# Patient Record
Sex: FEMALE | Employment: UNEMPLOYED | URBAN - METROPOLITAN AREA
[De-identification: names, ages, dates, MRNs, and addresses within clinical notes are randomized per-mention and may not be internally consistent; named-entity substitution may affect disease eponyms.]

---

## 2021-11-16 ENCOUNTER — OFFICE VISIT (OUTPATIENT)
Dept: URGENT CARE | Facility: CLINIC | Age: 56
End: 2021-11-16
Payer: COMMERCIAL

## 2021-11-16 VITALS
WEIGHT: 110 LBS | OXYGEN SATURATION: 98 % | HEART RATE: 68 BPM | DIASTOLIC BLOOD PRESSURE: 71 MMHG | RESPIRATION RATE: 15 BRPM | BODY MASS INDEX: 18.78 KG/M2 | HEIGHT: 64 IN | SYSTOLIC BLOOD PRESSURE: 103 MMHG | TEMPERATURE: 98 F

## 2021-11-16 DIAGNOSIS — H00.011 HORDEOLUM EXTERNUM OF RIGHT UPPER EYELID: Primary | ICD-10-CM

## 2021-11-16 PROBLEM — N60.11 DIFFUSE CYSTIC MASTOPATHY OF BOTH BREASTS: Status: ACTIVE | Noted: 2020-01-31

## 2021-11-16 PROBLEM — N60.12 DIFFUSE CYSTIC MASTOPATHY OF BOTH BREASTS: Status: ACTIVE | Noted: 2020-01-31

## 2021-11-16 PROBLEM — N95.2 ATROPHIC VAGINITIS: Status: ACTIVE | Noted: 2019-01-18

## 2021-11-16 PROCEDURE — 3008F PR BODY MASS INDEX (BMI) DOCUMENTED: ICD-10-PCS | Mod: CPTII,S$GLB,, | Performed by: NURSE PRACTITIONER

## 2021-11-16 PROCEDURE — 3008F BODY MASS INDEX DOCD: CPT | Mod: CPTII,S$GLB,, | Performed by: NURSE PRACTITIONER

## 2021-11-16 PROCEDURE — 3078F DIAST BP <80 MM HG: CPT | Mod: CPTII,S$GLB,, | Performed by: NURSE PRACTITIONER

## 2021-11-16 PROCEDURE — 1160F RVW MEDS BY RX/DR IN RCRD: CPT | Mod: CPTII,S$GLB,, | Performed by: NURSE PRACTITIONER

## 2021-11-16 PROCEDURE — 3078F PR MOST RECENT DIASTOLIC BLOOD PRESSURE < 80 MM HG: ICD-10-PCS | Mod: CPTII,S$GLB,, | Performed by: NURSE PRACTITIONER

## 2021-11-16 PROCEDURE — 1159F MED LIST DOCD IN RCRD: CPT | Mod: CPTII,S$GLB,, | Performed by: NURSE PRACTITIONER

## 2021-11-16 PROCEDURE — 3074F PR MOST RECENT SYSTOLIC BLOOD PRESSURE < 130 MM HG: ICD-10-PCS | Mod: CPTII,S$GLB,, | Performed by: NURSE PRACTITIONER

## 2021-11-16 PROCEDURE — 1159F PR MEDICATION LIST DOCUMENTED IN MEDICAL RECORD: ICD-10-PCS | Mod: CPTII,S$GLB,, | Performed by: NURSE PRACTITIONER

## 2021-11-16 PROCEDURE — 1160F PR REVIEW ALL MEDS BY PRESCRIBER/CLIN PHARMACIST DOCUMENTED: ICD-10-PCS | Mod: CPTII,S$GLB,, | Performed by: NURSE PRACTITIONER

## 2021-11-16 PROCEDURE — 3074F SYST BP LT 130 MM HG: CPT | Mod: CPTII,S$GLB,, | Performed by: NURSE PRACTITIONER

## 2021-11-16 PROCEDURE — 99203 PR OFFICE/OUTPT VISIT, NEW, LEVL III, 30-44 MIN: ICD-10-PCS | Mod: S$GLB,,, | Performed by: NURSE PRACTITIONER

## 2021-11-16 PROCEDURE — 99203 OFFICE O/P NEW LOW 30 MIN: CPT | Mod: S$GLB,,, | Performed by: NURSE PRACTITIONER

## 2021-11-16 RX ORDER — LEVOTHYROXINE SODIUM 88 UG/1
88 TABLET ORAL
COMMUNITY
Start: 2021-07-19

## 2021-11-16 RX ORDER — SPIRONOLACTONE 50 MG/1
TABLET, FILM COATED ORAL
COMMUNITY
Start: 2021-09-27

## 2021-11-16 RX ORDER — AMLODIPINE BESYLATE 10 MG/1
10 TABLET ORAL
COMMUNITY

## 2021-11-16 RX ORDER — LORATADINE 10 MG/1
10 TABLET ORAL
COMMUNITY

## 2021-11-16 RX ORDER — POLYMYXIN B SULFATE AND TRIMETHOPRIM 1; 10000 MG/ML; [USP'U]/ML
1 SOLUTION OPHTHALMIC EVERY 6 HOURS
Qty: 1.3334 ML | Refills: 0 | Status: SHIPPED | OUTPATIENT
Start: 2021-11-16 | End: 2021-11-21

## 2021-11-16 RX ORDER — LIOTHYRONINE SODIUM 5 UG/1
TABLET ORAL
COMMUNITY
Start: 2021-07-22

## 2023-08-06 ENCOUNTER — OFFICE VISIT (OUTPATIENT)
Dept: URGENT CARE | Facility: CLINIC | Age: 58
End: 2023-08-06
Payer: COMMERCIAL

## 2023-08-06 VITALS
WEIGHT: 110 LBS | HEIGHT: 64 IN | BODY MASS INDEX: 18.78 KG/M2 | RESPIRATION RATE: 16 BRPM | OXYGEN SATURATION: 99 % | SYSTOLIC BLOOD PRESSURE: 105 MMHG | TEMPERATURE: 97 F | HEART RATE: 68 BPM | DIASTOLIC BLOOD PRESSURE: 72 MMHG

## 2023-08-06 DIAGNOSIS — N30.01 ACUTE CYSTITIS WITH HEMATURIA: ICD-10-CM

## 2023-08-06 DIAGNOSIS — R35.0 FREQUENCY OF URINATION: ICD-10-CM

## 2023-08-06 DIAGNOSIS — R30.0 DYSURIA: Primary | ICD-10-CM

## 2023-08-06 DIAGNOSIS — J06.9 ACUTE URI: ICD-10-CM

## 2023-08-06 LAB
BILIRUB UR QL STRIP: NEGATIVE
GLUCOSE UR QL STRIP: NEGATIVE
KETONES UR QL STRIP: NEGATIVE
LEUKOCYTE ESTERASE UR QL STRIP: POSITIVE
PH, POC UA: 6.5 (ref 5–8)
POC BLOOD, URINE: POSITIVE
POC NITRATES, URINE: NEGATIVE
PROT UR QL STRIP: POSITIVE
SP GR UR STRIP: 1.02 (ref 1–1.03)
UROBILINOGEN UR STRIP-ACNC: NORMAL (ref 0.1–1.1)

## 2023-08-06 PROCEDURE — 99214 OFFICE O/P EST MOD 30 MIN: CPT | Mod: S$GLB,,, | Performed by: FAMILY MEDICINE

## 2023-08-06 PROCEDURE — 81003 URINALYSIS AUTO W/O SCOPE: CPT | Mod: QW,S$GLB,, | Performed by: FAMILY MEDICINE

## 2023-08-06 PROCEDURE — 87086 URINE CULTURE/COLONY COUNT: CPT | Performed by: FAMILY MEDICINE

## 2023-08-06 PROCEDURE — 99214 PR OFFICE/OUTPT VISIT, EST, LEVL IV, 30-39 MIN: ICD-10-PCS | Mod: S$GLB,,, | Performed by: FAMILY MEDICINE

## 2023-08-06 PROCEDURE — 81003 POCT URINALYSIS, DIPSTICK, AUTOMATED, W/O SCOPE: ICD-10-PCS | Mod: QW,S$GLB,, | Performed by: FAMILY MEDICINE

## 2023-08-06 RX ORDER — CEPHALEXIN 500 MG/1
500 CAPSULE ORAL EVERY 12 HOURS
Qty: 10 CAPSULE | Refills: 0 | Status: SHIPPED | OUTPATIENT
Start: 2023-08-06 | End: 2023-08-11

## 2023-08-06 RX ORDER — PROGESTERONE 100 MG/1
100 CAPSULE ORAL NIGHTLY
COMMUNITY
Start: 2023-03-31

## 2023-08-06 RX ORDER — ALBUTEROL SULFATE 90 UG/1
2 AEROSOL, METERED RESPIRATORY (INHALATION) EVERY 6 HOURS PRN
COMMUNITY

## 2023-08-06 NOTE — PROGRESS NOTES
"Subjective:      Patient ID: Dimple Carpio is a 57 y.o. female.    Vitals:  height is 5' 4" (1.626 m) and weight is 49.9 kg (110 lb). Her oral temperature is 97.1 °F (36.2 °C). Her blood pressure is 105/72 and her pulse is 68. Her respiration is 16 and oxygen saturation is 99%.     Chief Complaint: Dysuria    Pt presents with c/o dysuria and increased frequency/urgency on and off for 1 week.  Also reports mild cough and congestion.  Denies fever, chills, abd pain, back pain, N/V, blood on urine, vaginal discharge, weakness/dizziness.        Dysuria   This is a recurrent problem. The current episode started today. The problem occurs every urination. The problem has been gradually worsening. The quality of the pain is described as burning and aching. The pain is at a severity of 4/10. The pain is mild. There has been no fever. She is Sexually active. There is No history of pyelonephritis. Associated symptoms include frequency, hematuria and urgency. Pertinent negatives include no behavior changes, chills, discharge, flank pain, hesitancy, nausea, possible pregnancy, sweats, vomiting, weight loss, bubble bath use, constipation, rash or withholding. She has tried increased fluids for the symptoms. The treatment provided no relief. Her past medical history is significant for recurrent UTIs. There is no history of catheterization, diabetes insipidus, diabetes mellitus, genitourinary reflux, hypertension, kidney stones, a single kidney, STD, urinary stasis or a urological procedure.       Constitution: Negative for chills.   Gastrointestinal:  Negative for nausea, vomiting and constipation.   Genitourinary:  Positive for dysuria, frequency, urgency and hematuria. Negative for flank pain.   Skin:  Negative for rash.      Objective:     Physical Exam    Assessment:     1. Dysuria    2. Frequency of urination    3. Acute cystitis with hematuria    4. Acute URI        Plan:   Discussed results/diagnosis/plan with " patient in clinic. Advised to f/u with PCP within 2-5 days. ER precautions given if symptoms get any worse. All questions answered. Patient verbally understood and agreed with treatment plan.   Patient declined COVID test at clinic but will do a home COVID test at home.    Dysuria  -     POCT Urinalysis, Dipstick, Automated, W/O Scope    Frequency of urination  -     POCT Urinalysis, Dipstick, Automated, W/O Scope    Acute cystitis with hematuria  -     Urine culture    Acute URI    Other orders  -     cephALEXin (KEFLEX) 500 MG capsule; Take 1 capsule (500 mg total) by mouth every 12 (twelve) hours. for 5 days  Dispense: 10 capsule; Refill: 0

## 2023-08-08 ENCOUNTER — TELEPHONE (OUTPATIENT)
Dept: URGENT CARE | Facility: CLINIC | Age: 58
End: 2023-08-08
Payer: COMMERCIAL

## 2023-08-08 LAB — BACTERIA UR CULT: NO GROWTH

## 2023-08-08 NOTE — TELEPHONE ENCOUNTER
Called For follow-up and negative urine culture.  Patient is feeling much better.  Advised to follow-up with PCP. Answered all questions.      Incision and debridement, lower extremity  01/20/2017  left  Active  [email or web address]